# Patient Record
Sex: FEMALE | Race: WHITE
[De-identification: names, ages, dates, MRNs, and addresses within clinical notes are randomized per-mention and may not be internally consistent; named-entity substitution may affect disease eponyms.]

---

## 2020-12-30 ENCOUNTER — HOSPITAL ENCOUNTER (OUTPATIENT)
Dept: HOSPITAL 75 - ER | Age: 80
Setting detail: OBSERVATION
LOS: 1 days | Discharge: HOME | End: 2020-12-31
Attending: FAMILY MEDICINE | Admitting: FAMILY MEDICINE
Payer: MEDICARE

## 2020-12-30 VITALS — DIASTOLIC BLOOD PRESSURE: 88 MMHG | SYSTOLIC BLOOD PRESSURE: 160 MMHG

## 2020-12-30 VITALS — BODY MASS INDEX: 23.24 KG/M2 | WEIGHT: 131.18 LBS | HEIGHT: 62.99 IN

## 2020-12-30 DIAGNOSIS — R07.89: Primary | ICD-10-CM

## 2020-12-30 DIAGNOSIS — R06.02: ICD-10-CM

## 2020-12-30 DIAGNOSIS — Z88.5: ICD-10-CM

## 2020-12-30 DIAGNOSIS — Z85.3: ICD-10-CM

## 2020-12-30 DIAGNOSIS — M81.0: ICD-10-CM

## 2020-12-30 DIAGNOSIS — I12.9: ICD-10-CM

## 2020-12-30 DIAGNOSIS — Z80.8: ICD-10-CM

## 2020-12-30 DIAGNOSIS — Z79.899: ICD-10-CM

## 2020-12-30 DIAGNOSIS — K27.9: ICD-10-CM

## 2020-12-30 DIAGNOSIS — N18.9: ICD-10-CM

## 2020-12-30 DIAGNOSIS — Z80.3: ICD-10-CM

## 2020-12-30 LAB
ALBUMIN SERPL-MCNC: 4.5 GM/DL (ref 3.2–4.5)
ALP SERPL-CCNC: 66 U/L (ref 40–136)
ALT SERPL-CCNC: 18 U/L (ref 0–55)
APTT BLD: 20 SEC (ref 24–35)
APTT PPP: YELLOW S
BACTERIA #/AREA URNS HPF: NEGATIVE /HPF
BASOPHILS # BLD AUTO: 0 10^3/UL (ref 0–0.1)
BASOPHILS NFR BLD AUTO: 0 % (ref 0–10)
BILIRUB SERPL-MCNC: 0.3 MG/DL (ref 0.1–1)
BILIRUB UR QL STRIP: NEGATIVE
BUN/CREAT SERPL: 15
CALCIUM SERPL-MCNC: 9.5 MG/DL (ref 8.5–10.1)
CHLORIDE SERPL-SCNC: 101 MMOL/L (ref 98–107)
CK SERPL-CCNC: 90 U/L (ref 29–168)
CO2 SERPL-SCNC: 22 MMOL/L (ref 21–32)
CREAT SERPL-MCNC: 1.32 MG/DL (ref 0.6–1.3)
EOSINOPHIL # BLD AUTO: 0.1 10^3/UL (ref 0–0.3)
EOSINOPHIL NFR BLD AUTO: 2 % (ref 0–10)
FIBRINOGEN PPP-MCNC: CLEAR MG/DL
GFR SERPLBLD BASED ON 1.73 SQ M-ARVRAT: 39 ML/MIN
GLUCOSE SERPL-MCNC: 96 MG/DL (ref 70–105)
GLUCOSE UR STRIP-MCNC: NEGATIVE MG/DL
HCT VFR BLD CALC: 32 % (ref 35–52)
HGB BLD-MCNC: 10.5 G/DL (ref 11.5–16)
INR PPP: 0.9 (ref 0.8–1.4)
KETONES UR QL STRIP: NEGATIVE
LEUKOCYTE ESTERASE UR QL STRIP: NEGATIVE
LYMPHOCYTES # BLD AUTO: 1.9 10^3/UL (ref 1–4)
LYMPHOCYTES NFR BLD AUTO: 31 % (ref 12–44)
MAGNESIUM SERPL-MCNC: 2 MG/DL (ref 1.6–2.4)
MANUAL DIFFERENTIAL PERFORMED BLD QL: NO
MCH RBC QN AUTO: 31 PG (ref 25–34)
MCHC RBC AUTO-ENTMCNC: 33 G/DL (ref 32–36)
MCV RBC AUTO: 93 FL (ref 80–99)
MONOCYTES # BLD AUTO: 0.4 10^3/UL (ref 0–1)
MONOCYTES NFR BLD AUTO: 6 % (ref 0–12)
NEUTROPHILS # BLD AUTO: 3.6 10^3/UL (ref 1.8–7.8)
NEUTROPHILS NFR BLD AUTO: 60 % (ref 42–75)
NITRITE UR QL STRIP: NEGATIVE
PH UR STRIP: 6.5 [PH] (ref 5–9)
PLATELET # BLD: 247 10^3/UL (ref 130–400)
PMV BLD AUTO: 9.3 FL (ref 9–12.2)
POTASSIUM SERPL-SCNC: 4.1 MMOL/L (ref 3.6–5)
PROT SERPL-MCNC: 7.5 GM/DL (ref 6.4–8.2)
PROT UR QL STRIP: NEGATIVE
PROTHROMBIN TIME: 12 SEC (ref 12.2–14.7)
RBC #/AREA URNS HPF: (no result) /HPF
SODIUM SERPL-SCNC: 133 MMOL/L (ref 135–145)
SP GR UR STRIP: <=1.005 (ref 1.02–1.02)
SQUAMOUS #/AREA URNS HPF: (no result) /HPF
WBC # BLD AUTO: 6 10^3/UL (ref 4.3–11)
WBC #/AREA URNS HPF: (no result) /HPF

## 2020-12-30 PROCEDURE — 83880 ASSAY OF NATRIURETIC PEPTIDE: CPT

## 2020-12-30 PROCEDURE — 71045 X-RAY EXAM CHEST 1 VIEW: CPT

## 2020-12-30 PROCEDURE — 83874 ASSAY OF MYOGLOBIN: CPT

## 2020-12-30 PROCEDURE — 81000 URINALYSIS NONAUTO W/SCOPE: CPT

## 2020-12-30 PROCEDURE — 96374 THER/PROPH/DIAG INJ IV PUSH: CPT

## 2020-12-30 PROCEDURE — 85025 COMPLETE CBC W/AUTO DIFF WBC: CPT

## 2020-12-30 PROCEDURE — 85610 PROTHROMBIN TIME: CPT

## 2020-12-30 PROCEDURE — 96372 THER/PROPH/DIAG INJ SC/IM: CPT

## 2020-12-30 PROCEDURE — 82550 ASSAY OF CK (CPK): CPT

## 2020-12-30 PROCEDURE — 93005 ELECTROCARDIOGRAM TRACING: CPT

## 2020-12-30 PROCEDURE — 99284 EMERGENCY DEPT VISIT MOD MDM: CPT

## 2020-12-30 PROCEDURE — 36415 COLL VENOUS BLD VENIPUNCTURE: CPT

## 2020-12-30 PROCEDURE — 96375 TX/PRO/DX INJ NEW DRUG ADDON: CPT

## 2020-12-30 PROCEDURE — 78452 HT MUSCLE IMAGE SPECT MULT: CPT

## 2020-12-30 PROCEDURE — 80053 COMPREHEN METABOLIC PANEL: CPT

## 2020-12-30 PROCEDURE — 85379 FIBRIN DEGRADATION QUANT: CPT

## 2020-12-30 PROCEDURE — 93017 CV STRESS TEST TRACING ONLY: CPT

## 2020-12-30 PROCEDURE — 93306 TTE W/DOPPLER COMPLETE: CPT

## 2020-12-30 PROCEDURE — 96361 HYDRATE IV INFUSION ADD-ON: CPT

## 2020-12-30 PROCEDURE — 84484 ASSAY OF TROPONIN QUANT: CPT

## 2020-12-30 PROCEDURE — 85730 THROMBOPLASTIN TIME PARTIAL: CPT

## 2020-12-30 PROCEDURE — 83735 ASSAY OF MAGNESIUM: CPT

## 2020-12-30 RX ADMIN — ASPIRIN SCH MG: 81 TABLET ORAL at 22:12

## 2020-12-30 NOTE — ED CARDIAC GENERAL
History of Present Illness


General


Chief Complaint:  Chest Pain


Stated Complaint:  SOB/CHEST PAIN/HEADACHE


Source:  patient


Exam Limitations:  no limitations





History of Present Illness


Date Seen by Provider:  Dec 30, 2020


Time Seen by Provider:  16:50


Initial Comments


72-year-old female presents to the ER with complaints of shortness of breath 

while she was shopping at the grocery store 2 hours prior to arrival. States it 

feels heavy in her chest, and difficult to breathe. Denies "chest pain", 

however, she does have some nausea without emesis. States she was diagnosed with

COVID on , and had an uncomplicated course. Denies fevers, chills, 

cough, abdominal pain.


NTG SL PTA:  No


ASA po PTA:  No





Allergies and Home Medications


Allergies


Coded Allergies:  


     hydrocodone (Verified  Adverse Reaction, Unknown, Nausea, 20)


     oxycodone (Verified  Adverse Reaction, Unknown, Nausea, 20)





Patient Home Medication List


Home Medication List Reviewed:  Yes





Review of Systems


Review of Systems


Constitutional:  see HPI


EENTM:  No Symptoms Reported


Respiratory:  See HPI


Cardiovascular:  See HPI


Gastrointestinal:  See HPI


Genitourinary:  No Symptoms Reported


Musculoskeletal:  no symptoms reported


Skin:  no symptoms reported


Psychiatric/Neurological:  No Symptoms Reported


Endocrine:  No Symptoms Reported


Hematologic/Lymphatic:  No Symptoms Reported





Past Medical-Social-Family Hx


Patient Social History


Recent Foreign Travel:  No


Contact w/Someone Who Travel:  No





Physical Exam


Vital Signs





Vital Signs - First Documented








 20





 17:01


 


Temp 36.0


 


Pulse 74


 


Resp 18


 


B/P (MAP) 192/74 (113)


 


Pulse Ox 100


 


O2 Delivery Room Air





Capillary Refill : Less Than 3 Seconds


Height, Weight, BMI


Height: '"


Weight: lbs. oz. kg;  BMI


Method:


General Appearance:  No Apparent Distress, WD/WN, Anxious


HEENT:  PERRL/EOMI, TMs Normal, Normal ENT Inspection, Pharynx Normal


Neck:  Full Range of Motion, Normal Inspection, Supple


Respiratory:  Chest Non Tender, Lungs Clear, Normal Breath Sounds, No Accessory 

Muscle Use, No Respiratory Distress


Cardiovascular:  Regular Rate, Rhythm, No Gallop, No Murmur, Normal Peripheral 

Pulses


Gastrointestinal:  Normal Bowel Sounds, Non Tender, Soft


Extremity:  Normal Capillary Refill, Normal Inspection, Normal Range of Motion, 

Non Tender, No Calf Tenderness


Neurologic/Psychiatric:  Alert, Oriented x3, No Motor/Sensory Deficits, Normal 

Mood/Affect, CNs II-XII Norm as Tested


Skin:  Normal Color, Warm/Dry





Progress/Results/Core Measures


Results/Orders


Lab Results





Laboratory Tests








Test


 20


17:30 20


17:47 Range/Units


 


 


White Blood Count


 6.0 


 


 4.3-11.0


10^3/uL


 


Red Blood Count


 3.43 L


 


 3.80-5.11


10^6/uL


 


Hemoglobin 10.5 L  11.5-16.0  g/dL


 


Hematocrit 32 L  35-52  %


 


Mean Corpuscular Volume 93   80-99  fL


 


Mean Corpuscular Hemoglobin 31   25-34  pg


 


Mean Corpuscular Hemoglobin


Concent 33 


 


 32-36  g/dL





 


Red Cell Distribution Width 13.1   10.0-14.5  %


 


Platelet Count


 247 


 


 130-400


10^3/uL


 


Mean Platelet Volume 9.3   9.0-12.2  fL


 


Immature Granulocyte % (Auto) 0    %


 


Neutrophils (%) (Auto) 60   42-75  %


 


Lymphocytes (%) (Auto) 31   12-44  %


 


Monocytes (%) (Auto) 6   0-12  %


 


Eosinophils (%) (Auto) 2   0-10  %


 


Basophils (%) (Auto) 0   0-10  %


 


Neutrophils # (Auto)


 3.6 


 


 1.8-7.8


10^3/uL


 


Lymphocytes # (Auto)


 1.9 


 


 1.0-4.0


10^3/uL


 


Monocytes # (Auto)


 0.4 


 


 0.0-1.0


10^3/uL


 


Eosinophils # (Auto)


 0.1 


 


 0.0-0.3


10^3/uL


 


Basophils # (Auto)


 0.0 


 


 0.0-0.1


10^3/uL


 


Immature Granulocyte # (Auto)


 0.0 


 


 0.0-0.1


10^3/uL


 


Prothrombin Time 12.0 L  12.2-14.7  SEC


 


INR Comment 0.9   0.8-1.4  


 


Activated Partial


Thromboplast Time 20 L


 


 24-35  SEC





 


D-Dimer


 < 0.27 


 


 0.00-0.49


UG/ML


 


Sodium Level 133 L  135-145  MMOL/L


 


Potassium Level 4.1   3.6-5.0  MMOL/L


 


Chloride Level 101     MMOL/L


 


Carbon Dioxide Level 22   21-32  MMOL/L


 


Anion Gap 10   5-14  MMOL/L


 


Blood Urea Nitrogen 20 H  7-18  MG/DL


 


Creatinine


 1.32 H


 


 0.60-1.30


MG/DL


 


Estimat Glomerular Filtration


Rate 39 


 


  





 


BUN/Creatinine Ratio 15    


 


Glucose Level 96     MG/DL


 


Calcium Level 9.5   8.5-10.1  MG/DL


 


Corrected Calcium 9.1   8.5-10.1  MG/DL


 


Magnesium Level 2.0   1.6-2.4  MG/DL


 


Total Bilirubin 0.3   0.1-1.0  MG/DL


 


Aspartate Amino Transf


(AST/SGOT) 22 


 


 5-34  U/L





 


Alanine Aminotransferase


(ALT/SGPT) 18 


 


 0-55  U/L





 


Alkaline Phosphatase 66     U/L


 


Total Creatine Kinase 90     U/L


 


Myoglobin


 48.1 


 


 10.0-92.0


NG/ML


 


Troponin I < 0.028   <0.028  NG/ML


 


B-Type Natriuretic Peptide 75.1   <100.0  PG/ML


 


Total Protein 7.5   6.4-8.2  GM/DL


 


Albumin 4.5   3.2-4.5  GM/DL


 


Urine Color  YELLOW   


 


Urine Clarity  CLEAR   


 


Urine pH  6.5  5-9  


 


Urine Specific Gravity  <=1.005  1.016-1.022  


 


Urine Protein  NEGATIVE  NEGATIVE  


 


Urine Glucose (UA)  NEGATIVE  NEGATIVE  


 


Urine Ketones  NEGATIVE  NEGATIVE  


 


Urine Nitrite  NEGATIVE  NEGATIVE  


 


Urine Bilirubin  NEGATIVE  NEGATIVE  


 


Urine Urobilinogen  0.2  < = 1.0  MG/DL


 


Urine Leukocyte Esterase  NEGATIVE  NEGATIVE  


 


Urine RBC (Auto)  NEGATIVE  NEGATIVE  


 


Urine RBC  NONE   /HPF


 


Urine WBC  NONE   /HPF


 


Urine Squamous Epithelial


Cells 


 RARE 


  /HPF





 


Urine Crystals  NONE   /LPF


 


Urine Bacteria  NEGATIVE   /HPF


 


Urine Casts  NONE   /LPF


 


Urine Mucus  NEGATIVE   /LPF


 


Urine Culture Indicated  NO   








My Orders





Orders - THEE TUTTLE APRN


Cbc With Automated Diff (20 17:04)


Magnesium (20 17:04)


Chest 1 View, Ap/Pa Only (20 17:04)


Ekg Tracing (20 17:04)


Comprehensive Metabolic Panel (20 17:04)


Myoglobin Serum (20 17:04)


Protime With Inr (20 17:04)


Partial Thromboplastin Time (20 17:04)


Ed Iv/Invasive Line Start (20 17:04)


Creatine Kinase (20 17:04)


BNP (20 17:04)


Fibrin Degradation Products (20 17:04)


Troponin I (20 17:04)


Ondansetron Injection (Zofran Injectio (20 17:45)


Morphine  Injection (Morphine  Injection (20 17:31)


Ua Culture If Indicated (20 17:53)


Ns Iv 500 Ml (Sodium Chloride 0.9%) (20 18:15)


Troponin I (20 20:00)


Metoprolol Succinate (Xl) Tab (Toprol Xl (20 19:45)


Enoxaparin Injection (Lovenox Injection) (20 20:15)





Medications Given in ED





Current Medications








 Medications  Dose


 Ordered  Sig/Manpreet


 Route  Start Time


 Stop Time Status Last Admin


Dose Admin


 


 Enoxaparin Sodium  40 mg  ONCE  ONCE


 SC  20 20:15


 20 20:16 DC 20 20:16


40 MG


 


 Ondansetron HCl  4 mg  ONCE  ONCE


 IVP  20 17:45


 20 17:46 DC 20 18:00


4 MG


 


 Sodium Chloride  500 ml @ 


 30 mls/hr  [id number] ONCE


 IV  20 18:15


 20 10:54  20 18:33


30 MLS/HR








Vital Signs/I&O











 20





 17:01 17:01


 


Temp  36.0


 


Pulse  74


 


Resp  18


 


B/P (MAP)  192/74 (113)


 


Pulse Ox  100


 


O2 Delivery Room Air 














 20





 00:00


 


Intake Total 500 ml


 


Balance 500 ml











Progress


Progress Note :  


Progress Note


Initiated cardiac protocol upon arrival. EKG is unremarkable. No acute ST 

changes appreciated. Labs reviewed and are unremarkable. However, she is having 

persistent shortness of breath. Discussed case with Dr. Nash, who recommended 

observation admission for further workup.





Discussed plan of care with patient and she is agreeable with plan.





Full Code


Initial ECG Impression Date:  Dec 30, 2020


Initial ECG Impression Time:  16:53


Initial ECG Rate:  75


Initial ECG Rhythm:  Normal Sinus


Initial ECG Intervals:  KS


Initial ECG Impression:  Nonspecific Changes


Initial ECG Comparisson:  No Previous ECG Available





Diagnostic Imaging





   Diagonstic Imaging:  Xray


   Plain Films/CT/US/NM/MRI:  chest


Comments


NAME:   TAMMI HENDERSON


MED REC#:   [id number]


ACCOUNT#:   [id number]


PT STATUS:   REG ER


:   1940


PHYSICIAN:   THEE TUTTLE


ADMIT DATE:   20/ER


***Signed***


Date of Exam:20





CHEST 1 VIEW, AP/PA ONLY








EXAMINATION: Chest 1 view





HISTORY: Chest pain





COMPARISON: None available.





FINDINGS: 





Heart size and pulmonary vasculature are normal. The lungs are


clear without consolidation, pleural effusion, or pneumothorax.


The osseous structures are intact.





IMPRESSION: 





1. No acute radiographic abnormality in the chest.





Dictated by: 





  Dictated on workstation # [id number]








Dict:   20


Trans:   20


[facility] [id number]





Interpreted by:     KIA PAREDES DO


Electronically signed by: KIA PAREDES DO 20





Departure


Communication (Admissions)


Time/Spoke to Admitting Phy:  18:58


Discussed case with Dr. Law agreeable with observation admission.


Time/Spoke to Consulting Phy:  18:52


Discussed case with Dr. Nash, who recommended observation admission for 

cardiac workup. Orders given for Toprol 50 mg XL now, Lovenox 40 mg subcutaneous

now and daily, and 81mg aspirin daily. Admit to hospitalist.





Impression





   Primary Impression:  


   Chest pain on exertion


Disposition:   ADMITTED AS INPATIENT


Condition:  Stable





Admissions


Decision to Admit Reason:  Admit from ER (General)


Decision to Admit/Date:  Dec 30, 2020


Time/Decision to Admit Time:  18:50











THEE TUTTLE           Dec 30, 2020 17:09

## 2020-12-30 NOTE — NUR
PT'S DAUGHTER MARQUIS CALLED, PASSWORD PROVIDED, PHONE CALL WAS MADE TO CHECK ON THIS PT. THIS 
RN WAS INFORMED BY MARQUIS THAT SHE WOULD LIKE DR. JOHNSTON TO CALL THIS PT SON IN LAW ROHITH WHO IS 
A NURSE PRACTITIONER -[phone]; ALSO IF NOTHING IS FOUND ON THE ECHO THEY WOULD LIKE 
THE PT TO HAVE A VQ SCAN. THEY ARE CONCERNED ABOUT THIS NEW INCREASED SOA WITH EXERTION. 
THIS RN SPOKE WITH THE PT, THIS PT AGREES THAT IT IS FINE FOR ANY PROVIDERS TO DISCUSS HER 
MEDICAL CARE WITH ROHITH.

## 2020-12-30 NOTE — NUR
TAMMI HENDERSON admitted to room 416-1, with an admitting diagnosis of CHEST PAIN, on 
12/30/20 from ED via , accompanied by STAFF.TAMMI HENDERSON introduced to surroundings, 
call light, bed controls, phone, TV, temperature control, lights, meal times, smoking 
policy, visitor policy, side rail policy, bathrooms and showers.  Patient Rights given to 
patient in the handbook. TAMMI HENDERSON verbalizes understanding that [facility] is not 
responsible for the loss or damage to any personal effects or valuables that are kept in the 
patients possession during their hospitalization. TAMMI HENDERSON verbalizes understanding of 
Interdisciplinary Patient Education. Patient and/or family were informed about the Rapid 
Response Team and its purpose.

## 2020-12-30 NOTE — DIAGNOSTIC IMAGING REPORT
EXAMINATION: Chest 1 view



HISTORY: Chest pain



COMPARISON: None available.



FINDINGS: 



Heart size and pulmonary vasculature are normal. The lungs are

clear without consolidation, pleural effusion, or pneumothorax.

The osseous structures are intact.



IMPRESSION: 



1. No acute radiographic abnormality in the chest.



Dictated by: 



  Dictated on workstation # [id number]

## 2020-12-31 VITALS — SYSTOLIC BLOOD PRESSURE: 182 MMHG | DIASTOLIC BLOOD PRESSURE: 76 MMHG

## 2020-12-31 VITALS — SYSTOLIC BLOOD PRESSURE: 118 MMHG | DIASTOLIC BLOOD PRESSURE: 64 MMHG

## 2020-12-31 VITALS — DIASTOLIC BLOOD PRESSURE: 72 MMHG | SYSTOLIC BLOOD PRESSURE: 149 MMHG

## 2020-12-31 VITALS — SYSTOLIC BLOOD PRESSURE: 133 MMHG | DIASTOLIC BLOOD PRESSURE: 61 MMHG

## 2020-12-31 VITALS — SYSTOLIC BLOOD PRESSURE: 179 MMHG | DIASTOLIC BLOOD PRESSURE: 72 MMHG

## 2020-12-31 VITALS — DIASTOLIC BLOOD PRESSURE: 78 MMHG | SYSTOLIC BLOOD PRESSURE: 146 MMHG

## 2020-12-31 VITALS — DIASTOLIC BLOOD PRESSURE: 67 MMHG | SYSTOLIC BLOOD PRESSURE: 137 MMHG

## 2020-12-31 VITALS — SYSTOLIC BLOOD PRESSURE: 145 MMHG | DIASTOLIC BLOOD PRESSURE: 64 MMHG

## 2020-12-31 LAB
ALBUMIN SERPL-MCNC: 3.9 GM/DL (ref 3.2–4.5)
ALP SERPL-CCNC: 53 U/L (ref 40–136)
ALT SERPL-CCNC: 17 U/L (ref 0–55)
BASOPHILS # BLD AUTO: 0 10^3/UL (ref 0–0.1)
BASOPHILS NFR BLD AUTO: 0 % (ref 0–10)
BILIRUB SERPL-MCNC: 0.3 MG/DL (ref 0.1–1)
BUN/CREAT SERPL: 19
CALCIUM SERPL-MCNC: 8.7 MG/DL (ref 8.5–10.1)
CHLORIDE SERPL-SCNC: 105 MMOL/L (ref 98–107)
CO2 SERPL-SCNC: 20 MMOL/L (ref 21–32)
CREAT SERPL-MCNC: 1.08 MG/DL (ref 0.6–1.3)
EOSINOPHIL # BLD AUTO: 0.2 10^3/UL (ref 0–0.3)
EOSINOPHIL NFR BLD AUTO: 2 % (ref 0–10)
GFR SERPLBLD BASED ON 1.73 SQ M-ARVRAT: 49 ML/MIN
GLUCOSE SERPL-MCNC: 82 MG/DL (ref 70–105)
HCT VFR BLD CALC: 31 % (ref 35–52)
HGB BLD-MCNC: 10 G/DL (ref 11.5–16)
LYMPHOCYTES # BLD AUTO: 2.2 10^3/UL (ref 1–4)
LYMPHOCYTES NFR BLD AUTO: 36 % (ref 12–44)
MANUAL DIFFERENTIAL PERFORMED BLD QL: NO
MCH RBC QN AUTO: 31 PG (ref 25–34)
MCHC RBC AUTO-ENTMCNC: 33 G/DL (ref 32–36)
MCV RBC AUTO: 95 FL (ref 80–99)
MONOCYTES # BLD AUTO: 0.5 10^3/UL (ref 0–1)
MONOCYTES NFR BLD AUTO: 8 % (ref 0–12)
NEUTROPHILS # BLD AUTO: 3.2 10^3/UL (ref 1.8–7.8)
NEUTROPHILS NFR BLD AUTO: 53 % (ref 42–75)
PLATELET # BLD: 261 10^3/UL (ref 130–400)
PMV BLD AUTO: 9.8 FL (ref 9–12.2)
POTASSIUM SERPL-SCNC: 4.2 MMOL/L (ref 3.6–5)
PROT SERPL-MCNC: 6.6 GM/DL (ref 6.4–8.2)
SODIUM SERPL-SCNC: 136 MMOL/L (ref 135–145)
WBC # BLD AUTO: 6.2 10^3/UL (ref 4.3–11)

## 2020-12-31 RX ADMIN — Medication SCH ML: at 14:14

## 2020-12-31 RX ADMIN — Medication SCH ML: at 06:16

## 2020-12-31 RX ADMIN — Medication SCH ML: at 00:13

## 2020-12-31 RX ADMIN — ASPIRIN SCH MG: 81 TABLET ORAL at 08:50

## 2020-12-31 NOTE — NUR
PATIENT REFUSED ASPIRIN.  SHE STATED SHE HAD A BLEEDING ULCER ABOUT 1 YEAR AGO AND WAS IN 
ICU.  SHE WAS TOLD NOT TO TAKE ANY ASPIRIN PRODUCTS.

## 2020-12-31 NOTE — NUR
SPOKE WITH THE PT AND CALLED [facility] TO COMPLETE THE MED REC



ENALAPRIL 20MG- DIRECTIONS SHOW 1 TAB BID HOWEVER PT SAYS SHE TAKES 1 TAB AT NOON

HCTZ 12.5MG- DIRECTIONS SHOW  TAB DAILY HOWEVER PT IS TAKING 1 TAB EVERY OTHER DAY DUE TO 
IT BEING TO HARD TO SPILT THE TABS



FILL DATE FROM [facility]:

09- ENALAPRIL 20MG #180/90DS

09- METOPROLOL SUCC 25MG #90/90DS

10- AMLODIPINE 5MG #90/90DS

11- ZOLPIDEM 5MG #28

12- DICLOFENAC GEL #1

12- HCTZ 12.5MG #45/90DS

12- LATANOPROST #1



PT GETS PROLIA EVERY 6 MONTHS FROM THE [facility] BUT SHE CANNOT REMEMBER THE DATE OF HER 
LAST DOSE. ACCORDING TO THE PT SHE HAS IT WRITTEN DOWN AT HOME AND KNOWS SHE IS NOT DUE FOR 
HER NEXT SHOT FOR SEVERAL MONTHS



OTC MEDS:

MTV

CALCIUM W/ VIT D

TYLENOL

NIACINAMIDE

SOOTH EYE DROPS

## 2020-12-31 NOTE — CONSULTATION-CARDIOLOGY
HPI-Cardiology


Cardiology Consultation:


Date of Consultation


20


Time Seen by a Provider:  09:30


Date of Admission


2020


Attending Physician


Alma Rosa Law MD


Admitting Physician


No,Local Physician


Consulting Physician


Sarah Nash MD





HPI:


Chief Complaint:


SOB


Ms. Cordero is a 80 yr old female admitted to 416 from the ED with c/o increasing

SOB while out shopping yesterday.  She reports she has had SOB following COVID 

infection in 2020.  However, typically she can sit and rest and the SOB

resolves.  Yesterday she sat for approx 2 hrs and her SOB persisted so she came 

to the ED.  She is currently not reporting any SOB.  No c/o CP, palpitations, 

syncope, near syncope or LE swelling.  No c/o n/v/d.  No c/o fever or chills.





Review of Systems-Cardiology


Review of Systems


Constitutional:  No chills, No fever


Ears/Nose/Throat:  No epistaxis, No recent hearing loss


Respiratory:  As described under HPI


Cardiovascular:  As described under HPI


Gastrointestinal:  No constipation, No diarrhea, No nausea, No vomiting


Genitourinary:  No dysuria, No hematuria


Musculoskeletal:  no symptoms reported


Skin:  No rash on exposed areas, No ulcerations on exposed areas


Psychiatric/Neurological:  No anxiety, No depression, No seizure, No focal 

weakness, No syncope


Hematologic:  No bleeding abnormalities





PMH-Social-Family Hx


Patient Social History


Alcohol Use:  Denies Use


Recreational Drug Use:  No


Smoking Status:  Never a Smoker


Recent Foreign Travel:  No


Recent Infectious Disease Expo:  No





Past Medical History


PMH


As described under Assessment.





Family Medical History


Family Medical History:  


She reports her mother had HTN.  She repots her brother had CAD.


Family History:  


19 FATHER


  Bradycardia


19 MOTHER


  Hypertension


  FHx: lung disease


G8 BROTHER


  Cardiovascular disease


  FHx: skin cancer


G8 SISTER, 


  Ovarian,uterine, liver, & lung cancer


G8 SISTER


  FHx: breast cancer


G8 SISTER


  Glioblastoma


Relation not specified for:


  Lung disease


  Sinus bradycardia


  Sinus bradycardia





Allergies and Home Medications


Allergies


Coded Allergies:  


     hydrocodone (Verified  Adverse Reaction, Unknown, Nausea, 20)


     oxycodone (Verified  Adverse Reaction, Unknown, Nausea, 20)





Physical Exam-Cardiology


Physical Exam


Vital Signs/I&O











 20





 00:13 00:34 04:07 04:33


 


Temp  36.6  36.8


 


Pulse 65 66 66 66


 


Resp  20  18


 


B/P (MAP)  118/64 (82)  137/67 (90)


 


Pulse Ox  96 96 97


 


O2 Delivery  Room Air  Room Air


 


    





 20  





 07:00 07:25  


 


Temp  36.0  


 


Pulse 63 64  


 


Resp  20  


 


B/P (MAP)  133/61 (85)  


 


Pulse Ox  97  


 


O2 Delivery  Room Air  














 20





 00:00


 


Intake Total 740 ml


 


Output Total 0 ml


 


Balance 740 ml





Capillary Refill : Less Than 3 Seconds


Constitutional:  AAO x 3, well-developed, well-nourished


HEENT:  PERRL, hearing is well preserved, oral hygience is good


Neck:  No carotid bruit; carotid pulses are 2 + bilaterally


Respiratory:  No accessory muscle use, No respiratory distress; chest expansion 

is symmetric, chest is bilaterally symmetric, lungs clear to auscultation


Cardiovascular:  regular rate-rhythm; No JVD; S1 and S2


Gastrointestinal:  No tender; soft, round, audible bowel sounds


Extremities:  no lower extremity edema bilateral


Neurologic/Psychiatric:  grossly intact (moves all extremities)


Skin:  No rash on exposed areas, No ulcerations on exposed areas





Data Review


Labs


Laboratory Tests


20 17:30: 


White Blood Count 6.0, Red Blood Count 3.43L, Hemoglobin 10.5L, Hematocrit 32L, 

Mean Corpuscular Volume 93, Mean Corpuscular Hemoglobin 31, Mean Corpuscular 

Hemoglobin Concent 33, Red Cell Distribution Width 13.1, Platelet Count 247, 

Mean Platelet Volume 9.3, Immature Granulocyte % (Auto) 0, Neutrophils (%) 

(Auto) 60, Lymphocytes (%) (Auto) 31, Monocytes (%) (Auto) 6, Eosinophils (%) 

(Auto) 2, Basophils (%) (Auto) 0, Neutrophils # (Auto) 3.6, Lymphocytes # (Auto)

1.9, Monocytes # (Auto) 0.4, Eosinophils # (Auto) 0.1, Basophils # (Auto) 0.0, 

Immature Granulocyte # (Auto) 0.0, Prothrombin Time 12.0L, INR Comment 0.9, 

Activated Partial Thromboplast Time 20L, D-Dimer < 0.27, Sodium Level 133L, 

Potassium Level 4.1, Chloride Level 101, Carbon Dioxide Level 22, Anion Gap 10, 

Blood Urea Nitrogen 20H, Creatinine 1.32H, Estimat Glomerular Filtration Rate 

39, BUN/Creatinine Ratio 15, Glucose Level 96, Calcium Level 9.5, Corrected 

Calcium 9.1, Magnesium Level 2.0, Total Bilirubin 0.3, Aspartate Amino Transf 

(AST/SGOT) 22, Alanine Aminotransferase (ALT/SGPT) 18, Alkaline Phosphatase 66, 

Total Creatine Kinase 90, Myoglobin 48.1, Troponin I < 0.028, B-Type Natriuretic

Peptide 75.1, Total Protein 7.5, Albumin 4.5


20 17:47: 


Urine Color YELLOW, Urine Clarity CLEAR, Urine pH 6.5, Urine Specific Gravity 

<=1.005, Urine Protein NEGATIVE, Urine Glucose (UA) NEGATIVE, Urine Ketones 

NEGATIVE, Urine Nitrite NEGATIVE, Urine Bilirubin NEGATIVE, Urine Urobilinogen 

0.2, Urine Leukocyte Esterase NEGATIVE, Urine RBC (Auto) NEGATIVE, Urine RBC 

NONE, Urine WBC NONE, Urine Squamous Epithelial Cells RARE, Urine Crystals NONE,

Urine Bacteria NEGATIVE, Urine Casts NONE, Urine Mucus NEGATIVE, Urine Culture 

Indicated NO


20 21:25: Troponin I < 0.028


20 05:20: 


White Blood Count 6.2, Red Blood Count 3.20L, Hemoglobin 10.0L, Hematocrit 31L, 

Mean Corpuscular Volume 95, Mean Corpuscular Hemoglobin 31, Mean Corpuscular 

Hemoglobin Concent 33, Red Cell Distribution Width 13.1, Platelet Count 261, 

Mean Platelet Volume 9.8, Immature Granulocyte % (Auto) 0, Neutrophils (%) 

(Auto) 53, Lymphocytes (%) (Auto) 36, Monocytes (%) (Auto) 8, Eosinophils (%) 

(Auto) 2, Basophils (%) (Auto) 0, Neutrophils # (Auto) 3.2, Lymphocytes # (Auto)

2.2, Monocytes # (Auto) 0.5, Eosinophils # (Auto) 0.2, Basophils # (Auto) 0.0, 

Immature Granulocyte # (Auto) 0.0, Sodium Level 136, Potassium Level 4.2, 

Chloride Level 105, Carbon Dioxide Level 20L, Anion Gap 11, Blood Urea Nitrogen 

20H, Creatinine 1.08, Estimat Glomerular Filtration Rate 49, BUN/Creatinine 

Ratio 19, Glucose Level 82, Calcium Level 8.7, Corrected Calcium 8.8, Total B

ilirubin 0.3, Aspartate Amino Transf (AST/SGOT) 24, Alanine Aminotransferase 

(ALT/SGPT) 17, Alkaline Phosphatase 53, Troponin I < 0.028, Total Protein 6.6, 

Albumin 3.9





Laboratory Tests


20 17:30








20 05:20











Radiology


NAME:   TAMMI CORDERO


MED REC#:   [id number]


ACCOUNT#:   [id number]


PT STATUS:   REG ER


:   1940


PHYSICIAN:   THEE TUTTLE APRN


ADMIT DATE:   20/ER


***Signed***


Date of Exam:20





CHEST 1 VIEW, AP/PA ONLY








EXAMINATION: Chest 1 view





HISTORY: Chest pain





COMPARISON: None available.





FINDINGS: 





Heart size and pulmonary vasculature are normal. The lungs are


clear without consolidation, pleural effusion, or pneumothorax.


The osseous structures are intact.





IMPRESSION: 





1. No acute radiographic abnormality in the chest.





Dictated by: 





  Dictated on workstation # [id number]








Dict:   20


Trans:   20


[facility] [id number]





Interpreted by:     KIA PAREDES DO


Electronically signed by: KIA PAREDES DO 20





ECG Impression


ECG


Initial ECG Rhythm:  Normal Sinus





A/P-Cardiology


Assessment/Admission Diagnosis


Progressive dyspnea of undetermined etiology





H/O COVID (+) 2020





HTN





PUD - reports intolerance to ASA





H/O pancreatitis





Discussion and Recomendations


Progressive dyspnea of undetermined etiology


Advise echocardiogram to eval structure and function


Advise MPI to eval perfusion


Monitor lab closely


Replace electrolytes as indicated


We would like to thank medical services for this consult





Clinical Quality Measures


AMI/AHF:


ASA po Prior to arrival:  No





DVT/VTE Risk/Contraindication:


Risk Factor Score Per Nursin


RFS Level Per Nursing on Admit:  2=Moderate











GABRIELA CHAPIN           Dec 31, 2020 10:22

## 2020-12-31 NOTE — DISCHARGE INST-SIMPLE/STANDARD
Discharge Inst-Standard


Patient Instructions/Follow Up


Plan of Care/Instructions/FU:  


Please continue to take your medications as written. Please follow up with


your primary care doctor to follow up this hospital stay.


Activity as Tolerated:  Yes


Discharge Diet:  Cardiac Diet


Return to The Hospital For:  


Chest pain, heart racing, shortness of breath, abdominal pain, fever,


confusion, if you feel you are getting worse.





Planned Outpatient Orders/Ref.


Pneu Vac Indicated:  Yes











JODIE JOHNSTON MD             Dec 31, 2020 5:25 pm

## 2020-12-31 NOTE — CONSULTATION-CARDIOLOGY
HPI-Cardiology


Cardiology Consultation:


Date of Consultation


20


Time Seen by a Provider:  17:10


Date of Admission





Attending Physician


Alma Rosa Law MD


Admitting Physician


No,Local Physician


Consulting Physician


SHARON RIVERA MD, MA, FACP, FACC, [facility], CCDS





Physician requesting consult: Dr Law





HPI:


Chief Complaint:


CC: SOB





HPI


Ms. Cordero is a 80 yr old female admitted to 416 from the ED with c/o increasing

SOB while out shopping yesterday.  She reports she has had SOB following COVID 

infection in 2020.  However, typically she can sit and rest and the SOB

resolves.  Yesterday she sat for approx 2 hrs and her SOB persisted so she came 

to the ED.  She is currently not reporting any SOB.  No c/o CP, palpitations, 

syncope, near syncope or LE swelling.  No c/o n/v/d.  No c/o fever or chills.





Review of Systems-Cardiology


Review of Systems


Constitutional:  No chills, No fever


Ears/Nose/Throat:  No epistaxis, No recent hearing loss


Respiratory:  As described under HPI


Cardiovascular:  As described under HPI


Gastrointestinal:  No constipation, No diarrhea, No nausea, No vomiting


Genitourinary:  No dysuria, No hematuria


Musculoskeletal:  no symptoms reported


Skin:  No rash on exposed areas, No ulcerations on exposed areas


Psychiatric/Neurological:  No anxiety, No depression, No seizure, No focal 

weakness, No syncope


Hematologic:  No bleeding abnormalities





PMH-Social-Family Hx


Patient Social History


Alcohol Use:  Denies Use


Recreational Drug Use:  No


Smoking Status:  Never a Smoker


Recent Foreign Travel:  No


Recent Infectious Disease Expo:  No





Past Medical History


PMH


As described under Assessment.





Family Medical History


Family Medical History:  


She reports her mother had HTN.  She repots her brother had CAD.


Family History:  


Bradycardia


  19 FATHER


Cardiovascular disease


  G8 BROTHER


FHx: breast cancer


  G8 SISTER


FHx: lung disease


  19 MOTHER


FHx: skin cancer


  G8 BROTHER


Glioblastoma


  G8 SISTER


Hypertension


  19 MOTHER


Lung disease


Ovarian,uterine, liver, & lung cancer


  G8 SISTER, 


Sinus bradycardia


Sinus bradycardia





Allergies and Home Medications


Allergies


Coded Allergies:  


     hydrocodone (Verified  Adverse Reaction, Unknown, Nausea, 20)


     oxycodone (Verified  Adverse Reaction, Unknown, Nausea, 20)





Home Medications


Acetaminophen 325 Mg Tablet, 325-650 MG PO Q8H PRN for PAIN-MILD (1-4), 

(Reported)


Amlodipine Besylate 5 Mg Tablet, 5 MG PO HS, (Reported)


Calcium Carbonate/Vitamin D3 1 Each Tablet, 1 EACH PO DAILY, (Reported)


Clonidine HCl 0.1 Mg Tablet, 0.05 MG PO BID PRN for BP OVER 160, (Reported)


   TAKES  OF A 0.1MG TAB 


Denosumab 60 Mg/1 Ml Disp.syrin, 60 MG SQ EVERY 6 MONTHS, (Reported)


Diclofenac Sodium 100 Gm Gel..gram., 4 GM TP BID PRN for PAIN-BREAKTHROUGH, 

(Reported)


Enalapril Maleate 20 Mg Tablet, 20 MG PO 1200, (Reported)


Hydrochlorothiazide 12.5 Mg Tablet, 12.5 MG PO Q48H, (Reported)


Latanoprost 2.5 Ml Drops, 1 DROPS OS HS, (Reported)


Metoprolol Succinate 25 Mg Tab.er.24h, 25 MG PO DAILY, (Reported)


Multivitamin 1 Each Tablet, 1 EACH PO DAILY, (Reported)


Niacinamide 500 Mg Tablet, 500 MG PO DAILY, (Reported)


Povidone 15 Ml Drops, 1-2 DROPS OT PRN PRN for DRY EYES, (Reported)


Zolpidem Tartrate 5 Mg Tablet, 2.5 MG PO HS, (Reported)


   TAKES  OF A 5MG TAB 





Patient Home Medication List


Home Medication List Reviewed:  Yes





Physical Exam-Cardiology


Physical Exam


Vital Signs/I&O











 20





 07:00 07:25 08:00 11:15


 


Temp  36.0  37.0


 


Pulse 63 64  60


 


Resp  20  16


 


B/P (MAP)  133/61 (85)  145/64 (91)


 


Pulse Ox  97 97 97


 


O2 Delivery  Room Air Room Air Room Air


 


    





 20





 12:32 13:55 14:02 14:04


 


Pulse 60 63 65 63


 


Resp   18 16


 


B/P (MAP)  146/78 (100) 182/76 (111) 179/72 (107)


 


Pulse Ox  98 99 99


 


O2 Delivery  Room Air Room Air Room Air





 20   





 16:00   


 


Temp 36.8   


 


Pulse 65   


 


Resp 18   


 


B/P (MAP) 149/72 (97)   


 


Pulse Ox 98   


 


O2 Delivery Room Air   














 20





 00:00


 


Intake Total 740 ml


 


Output Total 0 ml


 


Balance 740 ml





Capillary Refill : Less Than 3 Seconds


Constitutional:  AAO x 3, well-developed, well-nourished


HEENT:  PERRL, hearing is well preserved, oral hygience is good


Neck:  No carotid bruit; carotid pulses are 2 + bilaterally


Respiratory:  No accessory muscle use, No respiratory distress; chest expansion 

is symmetric, chest is bilaterally symmetric, lungs clear to auscultation


Cardiovascular:  regular rate-rhythm; No JVD; S1 and S2


Gastrointestinal:  No tender; soft, round, audible bowel sounds


Extremities:  no lower extremity edema bilateral


Neurologic/Psychiatric:  grossly intact (moves all extremities)


Skin:  No rash on exposed areas, No ulcerations on exposed areas





Data Review


Labs


Laboratory Tests


20 17:47: 


Urine Color YELLOW, Urine Clarity CLEAR, Urine pH 6.5, Urine Specific Gravity 

<=1.005, Urine Protein NEGATIVE, Urine Glucose (UA) NEGATIVE, Urine Ketones 

NEGATIVE, Urine Nitrite NEGATIVE, Urine Bilirubin NEGATIVE, Urine Urobilinogen 

0.2, Urine Leukocyte Esterase NEGATIVE, Urine RBC (Auto) NEGATIVE, Urine RBC 

NONE, Urine WBC NONE, Urine Squamous Epithelial Cells RARE, Urine Crystals NONE,

Urine Bacteria NEGATIVE, Urine Casts NONE, Urine Mucus NEGATIVE, Urine Culture 

Indicated NO


20 21:25: Troponin I < 0.028


20 05:20: 


Troponin I < 0.028, White Blood Count 6.2, Red Blood Count 3.20L, Hemoglobin 

10.0L, Hematocrit 31L, Mean Corpuscular Volume 95, Mean Corpuscular Hemoglobin 

31, Mean Corpuscular Hemoglobin Concent 33, Red Cell Distribution Width 13.1, 

Platelet Count 261, Mean Platelet Volume 9.8, Immature Granulocyte % (Auto) 0, 

Neutrophils (%) (Auto) 53, Lymphocytes (%) (Auto) 36, Monocytes (%) (Auto) 8, 

Eosinophils (%) (Auto) 2, Basophils (%) (Auto) 0, Neutrophils # (Auto) 3.2, 

Lymphocytes # (Auto) 2.2, Monocytes # (Auto) 0.5, Eosinophils # (Auto) 0.2, 

Basophils # (Auto) 0.0, Immature Granulocyte # (Auto) 0.0, Sodium Level 136, 

Potassium Level 4.2, Chloride Level 105, Carbon Dioxide Level 20L, Anion Gap 11,

Blood Urea Nitrogen 20H, Creatinine 1.08, Estimat Glomerular Filtration Rate 49,

BUN/Creatinine Ratio 19, Glucose Level 82, Calcium Level 8.7, Corrected Calcium 

8.8, Total Bilirubin 0.3, Aspartate Amino Transf (AST/SGOT) 24, Alanine 

Aminotransferase (ALT/SGPT) 17, Alkaline Phosphatase 53, Total Protein 6.6, 

Albumin 3.9





Laboratory Tests


20 17:30








20 05:20











A/P-Cardiology


Assessment/Admission Diagnosis





Exertional shortness of breath, noncardiac (see w/u below) etiology 

undetermined, managed by Dr Law





Anemia of undetermined etiology, managed by Dr Law





MPI of 20: no ischemia or infarction, LVEF 78%





Echo of 20: LVEF 60-65%, PASP 25-30 %





H/O COVID (+) 2020





HTN





PUD - reports intolerance to ASA





H/O pancreatitis





Discussion and Recomendations





* Shortness of breath does not appear to be of cardiac origin. Ok to d/c from 

  cardiac standpoint


* Continue treatment for hypertension


* Outpt cardiac f/u advised


* Other issues being managed by Dr Law





Clinical Quality Measures


AMI/AHF:


ASA po Prior to arrival:  No





DVT/VTE Risk/Contraindication:


Risk Factor Score Per Nursin


RFS Level Per Nursing on Admit:  2=Moderate











SHARON RIVERA MD FACP FAC CCDS   Dec 31, 2020 17:47

## 2020-12-31 NOTE — STRESS TEST
DATE OF SERVICE:  12/31/2020



RESTING AND POST REGADENOSON TECHNETIUM-99M TETROFOSMIN SPECT CT IMAGING



ORDERING PHYSICIAN:

GENET Garcia



ATTENDING PHYSICIAN:

Dr. Law.



CLINICAL DIAGNOSIS:

Chest discomfort.



Baseline images were carried out after injection of 9.84 mCi of technetium-99m

Tetrofosmin.  This was followed by 0.4 mg regadenoson and 31 mCi of

technetium-99m Tetrofosmin for stress imaging.  The electrocardiogram showed

sinus rhythm at baseline.  It did not change significantly with regadenoson

infusion.  The patient tolerated the procedure well.  Review of images at rest

and following stress does not indicate any significant perfusion defects

consistent with myocardial ischemia or infarction.  Gated images show normal

global left ventricular systolic function with normal regional wall motion. 

Left ventricular ejection fraction is calculated to be 78%.  Left ventricular

end diastolic volume is 21 mL.  TID is absent (1.11).



CONCLUSIONS:

1.  No evidence of any significant myocardial ischemia or infarction on this

study.

2.  Normal to hyperdynamic left ventricular systolic function with a calculated

ejection fraction of 78%.





Job ID: [id number]

DocumentID: [id number]

Dictated Date:  12/31/2020 17:06:50

Transcription Date: 12/31/2020 19:58:30

Dictated By: SHARON RIVERA MD, MA, FACP, FACC,

## 2021-05-11 ENCOUNTER — HOSPITAL ENCOUNTER (OUTPATIENT)
Dept: HOSPITAL 75 - RAD | Age: 81
End: 2021-05-11
Attending: FAMILY MEDICINE
Payer: MEDICARE

## 2021-05-11 DIAGNOSIS — M47.816: Primary | ICD-10-CM

## 2021-05-11 DIAGNOSIS — M51.36: ICD-10-CM

## 2021-05-11 DIAGNOSIS — M43.16: ICD-10-CM

## 2021-05-11 PROCEDURE — 72148 MRI LUMBAR SPINE W/O DYE: CPT

## 2021-05-11 PROCEDURE — 72195 MRI PELVIS W/O DYE: CPT

## 2021-05-11 NOTE — DIAGNOSTIC IMAGING REPORT
PROCEDURE: MRI lumbar spine without contrast.



TECHNIQUE:  Multiplanar, multisequence MRI of the lumbar spine

was performed without contrast.



INDICATION: Low back pain. Left-sided lower extremity

radiculopathy.



COMPARISON: None.



FINDINGS: 5 lumbar type vertebral bodies are visualized with the

last well-formed disc space designated L5-S1. No acute fracture

or dislocation is seen in the lumbar spine. There is

straightening of the lumbar spine with grade 1 anterolisthesis of

L4 on L5. No focal osseous lesions are identified. Endplate

degenerative changes are present at the L1-L2 and L4-L5 levels,

both of which represent Modic type I changes.



The conus terminates at the L1 level. No masses are seen

associated with the conus or nerve roots of the cauda equina. No

epidural collections are identified.



Multilevel degenerative changes are seen in the lumbar spine with

disc bulges, facet hypertrophy, and buckling of the ligamentum

flavum.



T12-L1: No significant spinal canal or foraminal stenosis. 

L1-L2: Disc desiccation with right subarticular protrusion, facet

hypertrophy, and buckling of the ligamentum flavum results in

moderate-to-severe right lateral recess stenosis and moderate

right and mild left foraminal stenosis. 

L2-L3: Broad-based disc bulge, facet hypertrophy, and buckling of

the ligamentum flavum results in mild-to-moderate spinal canal

narrowing and mild-to-moderate right and moderate left foraminal

stenosis. 

L3-L4: Broad-based disc bulge, facet hypertrophy, and buckling of

the ligamentum flavum results in mild-to-moderate spinal canal

stenosis and moderate-to-severe right and moderate left foraminal

stenosis. 

L4-L5: Broad-based disc bulge, facet hypertrophy, and buckling of

the ligamentum flavum results in severe spinal canal stenosis and

moderate right and moderate-to-severe left foraminal stenosis. 

L5-S1: Broad-based disc bulge, facet hypertrophy, and buckling of

the ligamentum flavum results in mild spinal canal narrowing and

moderate bilateral foraminal stenosis.  



Paravertebral soft tissues are unremarkable. Benign appearing

cortical cysts are seen in the kidneys.



IMPRESSION:

1. No acute fracture or dislocation in the lumbar spine. 

2. Multilevel degenerative changes in the lumbar spine, greatest

at L4-L5.

3. Modic type I endplate degenerative changes at the L1-L2 and

L4-L5 levels.

4. Grade 1 anterolisthesis of L4 on L5.



Dictated by: 



  Dictated on workstation # [id number]

## 2021-05-11 NOTE — DIAGNOSTIC IMAGING REPORT
EXAMINATION: MRI pelvis without contrast from 05/11/2021.



TECHNIQUE: Multiplanar, multisequence MRI of the pelvis was

performed without contrast.



INDICATION: Sacral and SI joint pain, low back pain. SI joint

pain is left greater than right.



FINDINGS: The osseous structures demonstrate normal signal

intensity. No fractures appreciated. The sacroiliac joints are

bilaterally symmetric in appearance. No abnormal signal intensity

appreciated.



There is T2 hyperintensity overlying both greater trochanters,

left greater than right, likely due to bursitis. A tear of the

adjacent tendons is difficult to exclude on the large

field-of-view. The hamstrings tendon origins are bilaterally

intact.



The visualized intrapelvic structures are unremarkable.



IMPRESSION:

1. Likely bilateral greater trochanteric bursitis, left worse

than right. Adjacent tendinous tears not excluded on this

examination.



Dictated by: 



  Dictated on workstation # [id number]

## 2021-05-21 ENCOUNTER — HOSPITAL ENCOUNTER (EMERGENCY)
Dept: HOSPITAL 75 - ER FS | Age: 81
Discharge: HOME | End: 2021-05-21
Payer: MEDICARE

## 2021-05-21 VITALS — HEIGHT: 62.99 IN | WEIGHT: 129.85 LBS | BODY MASS INDEX: 23.01 KG/M2

## 2021-05-21 VITALS — SYSTOLIC BLOOD PRESSURE: 140 MMHG | DIASTOLIC BLOOD PRESSURE: 54 MMHG

## 2021-05-21 DIAGNOSIS — Z87.19: ICD-10-CM

## 2021-05-21 DIAGNOSIS — R11.0: ICD-10-CM

## 2021-05-21 DIAGNOSIS — I10: ICD-10-CM

## 2021-05-21 DIAGNOSIS — K59.00: Primary | ICD-10-CM

## 2021-05-21 DIAGNOSIS — M81.0: ICD-10-CM

## 2021-05-21 DIAGNOSIS — Z79.899: ICD-10-CM

## 2021-05-21 LAB
ALBUMIN SERPL-MCNC: 4.2 GM/DL (ref 3.2–4.5)
ALP SERPL-CCNC: 58 U/L (ref 40–136)
ALT SERPL-CCNC: 11 U/L (ref 0–55)
BASOPHILS # BLD AUTO: 0 10^3/UL (ref 0–0.1)
BASOPHILS NFR BLD AUTO: 1 % (ref 0–10)
BILIRUB SERPL-MCNC: 0.2 MG/DL (ref 0.1–1)
BUN/CREAT SERPL: 13
CALCIUM SERPL-MCNC: 9.6 MG/DL (ref 8.5–10.1)
CHLORIDE SERPL-SCNC: 91 MMOL/L (ref 98–107)
CO2 SERPL-SCNC: 22 MMOL/L (ref 21–32)
CREAT SERPL-MCNC: 1.04 MG/DL (ref 0.6–1.3)
EOSINOPHIL # BLD AUTO: 0.1 10^3/UL (ref 0–0.3)
EOSINOPHIL NFR BLD AUTO: 2 % (ref 0–10)
GFR SERPLBLD BASED ON 1.73 SQ M-ARVRAT: 51 ML/MIN
GLUCOSE SERPL-MCNC: 108 MG/DL (ref 70–105)
HCT VFR BLD CALC: 33 % (ref 35–52)
HGB BLD-MCNC: 11.2 G/DL (ref 11.5–16)
LYMPHOCYTES # BLD AUTO: 1.7 X 10^3 (ref 1–4)
LYMPHOCYTES NFR BLD AUTO: 26 % (ref 12–44)
MANUAL DIFFERENTIAL PERFORMED BLD QL: NO
MCH RBC QN AUTO: 30 PG (ref 25–34)
MCHC RBC AUTO-ENTMCNC: 34 G/DL (ref 32–36)
MCV RBC AUTO: 89 FL (ref 80–99)
MONOCYTES # BLD AUTO: 0.5 X 10^3 (ref 0–1)
MONOCYTES NFR BLD AUTO: 8 % (ref 0–12)
NEUTROPHILS # BLD AUTO: 4.1 X 10^3 (ref 1.8–7.8)
NEUTROPHILS NFR BLD AUTO: 64 % (ref 42–75)
PLATELET # BLD: 288 10^3/UL (ref 130–400)
PMV BLD AUTO: 9.7 FL (ref 7.4–10.4)
POTASSIUM SERPL-SCNC: 4.7 MMOL/L (ref 3.6–5)
PROT SERPL-MCNC: 7 GM/DL (ref 6.4–8.2)
SODIUM SERPL-SCNC: 126 MMOL/L (ref 135–145)
WBC # BLD AUTO: 6.5 10^3/UL (ref 4.3–11)

## 2021-05-21 PROCEDURE — 80053 COMPREHEN METABOLIC PANEL: CPT

## 2021-05-21 PROCEDURE — 36415 COLL VENOUS BLD VENIPUNCTURE: CPT

## 2021-05-21 PROCEDURE — 85025 COMPLETE CBC W/AUTO DIFF WBC: CPT

## 2021-05-21 PROCEDURE — 74019 RADEX ABDOMEN 2 VIEWS: CPT

## 2021-05-21 NOTE — DIAGNOSTIC IMAGING REPORT
INDICATION: Abdominal pain and vomiting.



TIME OF EXAM: 3:01 PM



FINDINGS: No free air is identified. There is a large amount of

stool throughout the colon consistent with constipation. Bowel

gas pattern appears nonobstructed. No pathologic calcifications

are seen.



IMPRESSION: Findings consistent with constipation.



Dictated by: 



  Dictated on workstation # [id number]

## 2021-05-21 NOTE — ED ABDOMINAL PAIN
General


Stated Complaint:  N/V





History of Present Illness


Date Seen by Provider:  May 21, 2021


Time Seen by Provider:  14:42


Initial Comments


80-year-old female presents with nausea and constipation.  Patient reports that 

she has been constipated for about 4 days.  She has had a hard small stool 4 

days ago.  She is got some mild nausea, some mild left lower quadrant pain.  No 

vomiting.  Maybe a little chilled this morning.  She has tried suppositories and

laxatives with no relief.  She denies any urinary symptoms.





Allergies and Home Medications


Allergies


Coded Allergies:  


     hydrocodone (Verified  Adverse Reaction, Unknown, Nausea, 12/30/20)


     oxycodone (Verified  Adverse Reaction, Unknown, Nausea, 12/30/20)





Home Medications


Acetaminophen 325 Mg Tablet, 325-650 MG PO Q8H PRN for PAIN-MILD (1-4), 

(Reported)


Amlodipine Besylate 5 Mg Tablet, 5 MG PO HS, (Reported)


Calcium Carbonate/Vitamin D3 1 Each Tablet, 1 EACH PO DAILY, (Reported)


Clonidine HCl 0.1 Mg Tablet, 0.05 MG PO BID PRN for BP OVER 160, (Reported)


   TAKES  OF A 0.1MG TAB 


Denosumab 60 Mg/1 Ml Disp.syrin, 60 MG SQ EVERY 6 MONTHS, (Reported)


Diclofenac Sodium 100 Gm Gel..gram., 4 GM TP BID PRN for PAIN-BREAKTHROUGH, 

(Reported)


Enalapril Maleate 20 Mg Tablet, 20 MG PO 1200, (Reported)


Hydrochlorothiazide 12.5 Mg Tablet, 12.5 MG PO Q48H, (Reported)


Latanoprost 2.5 Ml Drops, 1 DROPS OS HS, (Reported)


Metoprolol Succinate 25 Mg Tab.er.24h, 25 MG PO DAILY, (Reported)


Multivitamin 1 Each Tablet, 1 EACH PO DAILY, (Reported)


Niacinamide 500 Mg Tablet, 500 MG PO DAILY, (Reported)


Povidone 15 Ml Drops, 1-2 DROPS OT PRN PRN for DRY EYES, (Reported)


Zolpidem Tartrate 5 Mg Tablet, 2.5 MG PO HS, (Reported)


   TAKES  OF A 5MG TAB 





Patient Home Medication List


Home Medication List Reviewed:  Yes





Review of Systems


Review of Systems


Constitutional:  chills; No fever


Respiratory:  Denies Cough, Denies Shortness of Air


Cardiovascular:  Denies Chest Pain, Denies Palpitations


Gastrointestinal:  See HPI, Abdominal Pain, Constipated, Nausea; Denies Vomiting


Genitourinary:  No Symptoms Reported


Musculoskeletal:  no symptoms reported


Skin:  no symptoms reported


Psychiatric/Neurological:  No Symptoms Reported


Endocrine:  No Symptoms Reported


Hematologic/Lymphatic:  No Symptoms Reported





Past Medical-Social-Family Hx


Patient Social History


Recent Hopitalizations:  No





Past Medical History


Surgeries:  Yes (maria ines mastectomy)


Eye Surgery, Gallbladder, Hysterectomy


Respiratory:  No


Cardiac:  Yes (tachycardia)


Hypertension


Genitourinary:  Yes (kideny aretery stenosis)


Colitis, Diverticulosis, Pancreatitis, Ulcer


Musculoskeletal:  Yes


Osteoporosis


Endocrine:  No


HEENT:  Yes


Cataract


Cancer:  Yes


Breast


Psychosocial:  No


Integumentary:  No


Blood Disorders:  No





Physical Exam


Vital Signs





Vital Signs - First Documented








 5/21/21





 14:43


 


Temp 37.0


 


Pulse 68


 


Resp 16


 


B/P (MAP) 140/59 (86)


 


Pulse Ox 100


 


O2 Delivery Room Air





Capillary Refill :


Height/Weight/BMI


Height: '"


Weight: lbs. oz. kg; 23.24 BMI


Method:


General Appearance:  WD/WN


HEENT:  PERRL/EOMI


Neck:  full range of motion, supple


Respiratory:  lungs clear, normal breath sounds


Cardiovascular:  normal peripheral pulses, regular rate, rhythm


Gastrointestinal:  soft, tenderness (Minimal tenderness left lower quadrant)


Extremities:  non-tender, normal inspection


Neurologic/Psychiatric:  alert, normal mood/affect, oriented x 3


Skin:  normal color, warm/dry





Progress/Results/Core Measures


Results/Orders


Lab Results





Laboratory Tests








Test


 5/21/21


14:48 Range/Units


 


 


White Blood Count


 6.5 


 4.3-11.0


10^3/uL


 


Red Blood Count


 3.68 L


 4.35-5.85


10^6/uL


 


Hemoglobin 11.2 L 11.5-16.0  G/DL


 


Hematocrit 33 L 35-52  %


 


Mean Corpuscular Volume 89  80-99  FL


 


Mean Corpuscular Hemoglobin 30  25-34  PG


 


Mean Corpuscular Hemoglobin


Concent 34 


 32-36  G/DL





 


Red Cell Distribution Width 12.4  10.0-14.5  %


 


Platelet Count


 288 


 130-400


10^3/uL


 


Mean Platelet Volume 9.7  7.4-10.4  FL


 


Immature Granulocyte % (Auto) 0   %


 


Neutrophils (%) (Auto) 64  42-75  %


 


Lymphocytes (%) (Auto) 26  12-44  %


 


Monocytes (%) (Auto) 8  0-12  %


 


Eosinophils (%) (Auto) 2  0-10  %


 


Basophils (%) (Auto) 1  0-10  %


 


Neutrophils # (Auto) 4.1  1.8-7.8  X 10^3


 


Lymphocytes # (Auto) 1.7  1.0-4.0  X 10^3


 


Monocytes # (Auto) 0.5  0.0-1.0  X 10^3


 


Eosinophils # (Auto)


 0.1 


 0.0-0.3


10^3/uL


 


Basophils # (Auto)


 0.0 


 0.0-0.1


10^3/uL


 


Immature Granulocyte # (Auto)


 0.0 


 0.0-0.1


10^3/uL


 


Percent Immature Platelet


Fraction 1.8 


 0.0-7.6  %





 


Sodium Level 126 L 135-145  MMOL/L


 


Potassium Level 4.7  3.6-5.0  MMOL/L


 


Chloride Level 91 L   MMOL/L


 


Carbon Dioxide Level 22  21-32  MMOL/L


 


Anion Gap 13  5-14  MMOL/L


 


Blood Urea Nitrogen 13  7-18  MG/DL


 


Creatinine


 1.04 


 0.60-1.30


MG/DL


 


Estimat Glomerular Filtration


Rate 51 


  





 


BUN/Creatinine Ratio 13   


 


Glucose Level 108 H   MG/DL


 


Calcium Level 9.6  8.5-10.1  MG/DL


 


Corrected Calcium 9.4  8.5-10.1  MG/DL


 


Total Bilirubin 0.2  0.1-1.0  MG/DL


 


Aspartate Amino Transf


(AST/SGOT) 28 


 5-34  U/L





 


Alanine Aminotransferase


(ALT/SGPT) 11 


 0-55  U/L





 


Alkaline Phosphatase 58    U/L


 


Total Protein 7.0  6.4-8.2  GM/DL


 


Albumin 4.2  3.2-4.5  GM/DL








My Orders





Orders - BOWLING,HORACIO L DO


Cbc With Automated Diff (5/21/21 14:45)


Comprehensive Metabolic Panel (5/21/21 14:45)


Ua Culture If Indicated (5/21/21 14:45)


Abdomen Flat & Upright/Decub (5/21/21 14:45)


Lactated Ringers (Lr 1000 Ml Iv Solution (5/21/21 14:45)


Metoclopramide Injection (Reglan Injecti (5/21/21 15:10)


Magnesium Citrate Oral Soln (Citrate Of (5/21/21 15:30)


Bisacodyl Suppository (Dulcolax Supposit (5/21/21 15:30)


Promethazine Injection (Phenergan Injec (5/21/21 17:18)


Ed Iv/Invasive Line Start (5/21/21 18:33)


Ns Iv 500 Ml (Sodium Chloride 0.9%) (5/21/21 18:45)


Na Phos/Na Biphos Enema (Fleet Enema [place] (5/21/21 19:15)





Medications Given in ED





Current Medications








 Medications  Dose


 Ordered  Sig/Manpreet


 Route  Start Time


 Stop Time Status Last Admin


Dose Admin


 


 Bisacodyl  10 mg  ONCE  ONCE


 W[place]  5/21/21 15:30


 5/21/21 15:31 DC 5/21/21 15:40


10 MG


 


 Magnesium Citrate  300 ml  ONCE  ONCE


 PO  5/21/21 15:30


 5/21/21 15:31 DC 5/21/21 16:25


300 ML


 


 Sodium


 Biphosphate/


 Sodium Phosphate  1 ea  ONCE  ONCE


 W[place]  5/21/21 19:15


 5/21/21 19:16 DC 5/21/21 19:12


1 EA


 


 Sodium Chloride  500 ml @ 0


 mls/hr  Q0M ONCE


 IV  5/21/21 18:45


 5/21/21 18:46 DC 5/21/21 19:00


1,000 MLS/HR








Vital Signs/I&O











 5/21/21





 14:43


 


Temp 37.0


 


Pulse 68


 


Resp 16


 


B/P (MAP) 140/59 (86)


 


Pulse Ox 100


 


O2 Delivery Room Air











Progress


Progress Note :  


Progress Note


Patient wanted to stay and attempt to have a bowel movement. Patient was given a

bottle of mag citrate. This upset her stomach and she had a episode of vomiting 

and some dry heaves following the mag citrate. Patient received about a liter 

and a half of fluids. The nausea subsided with some medication. Patient 

initially wanted to try an enema. I did discuss with her that the stool load 

seem to be high up in her abdomen and with unlikely work but we did attempt it 

with no relief. Patient was offered admission with nasal NG tube and and further

treatment but she declined. I discussed with patient the need to use MiraLAX. To

use 4-5 times a day. That she should put a capful in beverage of her choice and 

sip on it slowly. She should drink plenty of fluids. She should also increase 

the fiber in her diet.. Patient stable and discharged home





Diagnostic Imaging





   Diagonstic Imaging:  Xray


   Plain Films/CT/US/NM/MRI:  abdomen


Comments


Date of Exam:05/21/21





ABDOMEN FLAT & UPRIGHT/DECUB








INDICATION: Abdominal pain and vomiting.





TIME OF EXAM: 3:01 PM





FINDINGS: No free air is identified. There is a large amount of


stool throughout the colon consistent with constipation. Bowel


gas pattern appears nonobstructed. No pathologic calcifications


are seen.





IMPRESSION: Findings consistent with constipation





Departure


Impression





   Primary Impression:  


   Constipation


   Qualified Codes:  K59.00 - Constipation, unspecified


Disposition:  01 HOME, SELF-CARE


Condition:  Stable





Departure-Patient Inst.


Referrals:  


NO,LOCAL PHYSICIAN (PCP/Family)


Primary Care Physician


Patient Instructions:  Constipation, Adult (DC), Dealing with Constipation from 

the Drugs You Take





Add. Discharge Instructions:  


MiraLAX, 1 capful 3-4 times daily until soft daily stool then once daily as 

needed for soft daily stool


Increase fiber in your diet, you may use Metamucil


Drink plenty of fluids


Frequent ambulation











HORACIO BOWLING DO               May 21, 2021 14:45

## 2021-08-16 ENCOUNTER — HOSPITAL ENCOUNTER (OUTPATIENT)
Dept: HOSPITAL 75 - ONC | Age: 81
LOS: 90 days | Discharge: HOME | End: 2021-11-14
Attending: INTERNAL MEDICINE
Payer: MEDICARE

## 2021-08-16 DIAGNOSIS — C50.111: Primary | ICD-10-CM

## 2021-08-16 DIAGNOSIS — M81.0: ICD-10-CM

## 2021-08-16 DIAGNOSIS — D50.9: ICD-10-CM

## 2021-08-16 DIAGNOSIS — Z90.13: ICD-10-CM

## 2021-08-16 LAB
ALBUMIN SERPL-MCNC: 4.2 GM/DL (ref 3.2–4.5)
ALP SERPL-CCNC: 79 U/L (ref 40–136)
ALT SERPL-CCNC: 12 U/L (ref 0–55)
BASOPHILS # BLD AUTO: 0.1 10^3/UL (ref 0–0.1)
BASOPHILS NFR BLD AUTO: 1 % (ref 0–10)
BILIRUB SERPL-MCNC: 0.2 MG/DL (ref 0.1–1)
BUN/CREAT SERPL: 18
CALCIUM SERPL-MCNC: 9.7 MG/DL (ref 8.5–10.1)
CHLORIDE SERPL-SCNC: 105 MMOL/L (ref 98–107)
CO2 SERPL-SCNC: 20 MMOL/L (ref 21–32)
CREAT SERPL-MCNC: 1.13 MG/DL (ref 0.6–1.3)
EOSINOPHIL # BLD AUTO: 0.2 10^3/UL (ref 0–0.3)
EOSINOPHIL NFR BLD AUTO: 2 % (ref 0–10)
GFR SERPLBLD BASED ON 1.73 SQ M-ARVRAT: 47 ML/MIN
GLUCOSE SERPL-MCNC: 117 MG/DL (ref 70–105)
HCT VFR BLD CALC: 34 % (ref 35–52)
HGB BLD-MCNC: 10.9 G/DL (ref 11.5–16)
LYMPHOCYTES # BLD AUTO: 1.6 10^3/UL (ref 1–4)
LYMPHOCYTES NFR BLD AUTO: 23 % (ref 12–44)
MANUAL DIFFERENTIAL PERFORMED BLD QL: NO
MCH RBC QN AUTO: 30 PG (ref 25–34)
MCHC RBC AUTO-ENTMCNC: 32 G/DL (ref 32–36)
MCV RBC AUTO: 93 FL (ref 80–99)
MONOCYTES # BLD AUTO: 0.3 10^3/UL (ref 0–1)
MONOCYTES NFR BLD AUTO: 5 % (ref 0–12)
NEUTROPHILS # BLD AUTO: 4.7 10^3/UL (ref 1.8–7.8)
NEUTROPHILS NFR BLD AUTO: 69 % (ref 42–75)
PLATELET # BLD: 292 10^3/UL (ref 130–400)
PMV BLD AUTO: 9 FL (ref 9–12.2)
POTASSIUM SERPL-SCNC: 3.9 MMOL/L (ref 3.6–5)
PROT SERPL-MCNC: 7.2 GM/DL (ref 6.4–8.2)
SODIUM SERPL-SCNC: 135 MMOL/L (ref 135–145)
WBC # BLD AUTO: 6.8 10^3/UL (ref 4.3–11)

## 2021-08-16 PROCEDURE — 85025 COMPLETE CBC W/AUTO DIFF WBC: CPT

## 2021-08-16 PROCEDURE — 99214 OFFICE O/P EST MOD 30 MIN: CPT

## 2021-08-16 PROCEDURE — 83550 IRON BINDING TEST: CPT

## 2021-08-16 PROCEDURE — 82728 ASSAY OF FERRITIN: CPT

## 2021-08-16 PROCEDURE — 80053 COMPREHEN METABOLIC PANEL: CPT

## 2021-08-16 PROCEDURE — 83540 ASSAY OF IRON: CPT

## 2021-09-17 ENCOUNTER — HOSPITAL ENCOUNTER (OUTPATIENT)
Dept: HOSPITAL 75 - LAB FS | Age: 81
End: 2021-09-17
Payer: MEDICARE

## 2021-09-17 DIAGNOSIS — Z01.812: Primary | ICD-10-CM

## 2021-09-17 LAB
APTT PPP: YELLOW S
BACTERIA #/AREA URNS HPF: (no result) /HPF
BASOPHILS # BLD AUTO: 0 10^3/UL (ref 0–0.1)
BASOPHILS NFR BLD AUTO: 1 % (ref 0–10)
BILIRUB UR QL STRIP: NEGATIVE
BUN/CREAT SERPL: 18
CALCIUM SERPL-MCNC: 9.6 MG/DL (ref 8.5–10.1)
CHLORIDE SERPL-SCNC: 98 MMOL/L (ref 98–107)
CO2 SERPL-SCNC: 23 MMOL/L (ref 21–32)
CREAT SERPL-MCNC: 1.26 MG/DL (ref 0.6–1.3)
EOSINOPHIL # BLD AUTO: 0.2 10^3/UL (ref 0–0.3)
EOSINOPHIL NFR BLD AUTO: 3 % (ref 0–10)
FIBRINOGEN PPP-MCNC: CLEAR MG/DL
GFR SERPLBLD BASED ON 1.73 SQ M-ARVRAT: 41 ML/MIN
GLUCOSE SERPL-MCNC: 107 MG/DL (ref 70–105)
GLUCOSE UR STRIP-MCNC: NEGATIVE MG/DL
HCT VFR BLD CALC: 35 % (ref 35–52)
HGB BLD-MCNC: 11.2 G/DL (ref 11.5–16)
KETONES UR QL STRIP: (no result)
LEUKOCYTE ESTERASE UR QL STRIP: NEGATIVE
LYMPHOCYTES # BLD AUTO: 1.6 X 10^3 (ref 1–4)
LYMPHOCYTES NFR BLD AUTO: 27 % (ref 12–44)
MANUAL DIFFERENTIAL PERFORMED BLD QL: NO
MCH RBC QN AUTO: 30 PG (ref 25–34)
MCHC RBC AUTO-ENTMCNC: 32 G/DL (ref 32–36)
MCV RBC AUTO: 93 FL (ref 80–99)
MONOCYTES # BLD AUTO: 0.4 X 10^3 (ref 0–1)
MONOCYTES NFR BLD AUTO: 7 % (ref 0–12)
NEUTROPHILS # BLD AUTO: 3.7 X 10^3 (ref 1.8–7.8)
NEUTROPHILS NFR BLD AUTO: 62 % (ref 42–75)
NITRITE UR QL STRIP: NEGATIVE
PH UR STRIP: 5.5 [PH] (ref 5–9)
PLATELET # BLD: 330 10^3/UL (ref 130–400)
PMV BLD AUTO: 9.4 FL (ref 9–12.2)
POTASSIUM SERPL-SCNC: 4.3 MMOL/L (ref 3.6–5)
PROT UR QL STRIP: NEGATIVE
RBC #/AREA URNS HPF: (no result) /HPF
SODIUM SERPL-SCNC: 134 MMOL/L (ref 135–145)
SP GR UR STRIP: 1.01 (ref 1.02–1.02)
SQUAMOUS #/AREA URNS HPF: (no result) /HPF
WBC # BLD AUTO: 5.9 10^3/UL (ref 4.3–11)
WBC #/AREA URNS HPF: (no result) /HPF

## 2021-09-17 PROCEDURE — 80048 BASIC METABOLIC PNL TOTAL CA: CPT

## 2021-09-17 PROCEDURE — 36415 COLL VENOUS BLD VENIPUNCTURE: CPT

## 2021-09-17 PROCEDURE — 87081 CULTURE SCREEN ONLY: CPT

## 2021-09-17 PROCEDURE — 81000 URINALYSIS NONAUTO W/SCOPE: CPT

## 2021-09-17 PROCEDURE — 85025 COMPLETE CBC W/AUTO DIFF WBC: CPT

## 2022-02-07 LAB
ALBUMIN SERPL-MCNC: 4.1 GM/DL (ref 3.2–4.5)
ALP SERPL-CCNC: 68 U/L (ref 40–136)
ALT SERPL-CCNC: 13 U/L (ref 0–55)
BASOPHILS # BLD AUTO: 0 10^3/UL (ref 0–0.1)
BASOPHILS NFR BLD AUTO: 0 % (ref 0–10)
BILIRUB SERPL-MCNC: 0.3 MG/DL (ref 0.1–1)
BUN/CREAT SERPL: 19
CALCIUM SERPL-MCNC: 9.6 MG/DL (ref 8.5–10.1)
CHLORIDE SERPL-SCNC: 101 MMOL/L (ref 98–107)
CO2 SERPL-SCNC: 24 MMOL/L (ref 21–32)
CREAT SERPL-MCNC: 1.08 MG/DL (ref 0.6–1.3)
EOSINOPHIL # BLD AUTO: 0.2 10^3/UL (ref 0–0.3)
EOSINOPHIL NFR BLD AUTO: 3 % (ref 0–10)
GFR SERPLBLD BASED ON 1.73 SQ M-ARVRAT: 52 ML/MIN
GLUCOSE SERPL-MCNC: 114 MG/DL (ref 70–105)
HCT VFR BLD CALC: 32 % (ref 35–52)
HGB BLD-MCNC: 10.2 G/DL (ref 11.5–16)
LYMPHOCYTES # BLD AUTO: 1.5 10^3/UL (ref 1–4)
LYMPHOCYTES NFR BLD AUTO: 23 % (ref 12–44)
MANUAL DIFFERENTIAL PERFORMED BLD QL: NO
MCH RBC QN AUTO: 31 PG (ref 25–34)
MCHC RBC AUTO-ENTMCNC: 32 G/DL (ref 32–36)
MCV RBC AUTO: 95 FL (ref 80–99)
MONOCYTES # BLD AUTO: 0.4 10^3/UL (ref 0–1)
MONOCYTES NFR BLD AUTO: 6 % (ref 0–12)
NEUTROPHILS # BLD AUTO: 4.2 10^3/UL (ref 1.8–7.8)
NEUTROPHILS NFR BLD AUTO: 67 % (ref 42–75)
PLATELET # BLD: 292 10^3/UL (ref 130–400)
PMV BLD AUTO: 9 FL (ref 9–12.2)
POTASSIUM SERPL-SCNC: 3.9 MMOL/L (ref 3.6–5)
PROT SERPL-MCNC: 7 GM/DL (ref 6.4–8.2)
SODIUM SERPL-SCNC: 137 MMOL/L (ref 135–145)
WBC # BLD AUTO: 6.2 10^3/UL (ref 4.3–11)

## 2022-02-11 ENCOUNTER — HOSPITAL ENCOUNTER (OUTPATIENT)
Dept: HOSPITAL 75 - ONC | Age: 82
LOS: 17 days | Discharge: HOME | End: 2022-02-28
Attending: INTERNAL MEDICINE
Payer: MEDICARE

## 2022-02-11 DIAGNOSIS — Z90.13: ICD-10-CM

## 2022-02-11 DIAGNOSIS — M54.50: ICD-10-CM

## 2022-02-11 DIAGNOSIS — D50.9: ICD-10-CM

## 2022-02-11 DIAGNOSIS — Z45.2: Primary | ICD-10-CM

## 2022-02-11 DIAGNOSIS — C50.111: ICD-10-CM

## 2022-02-11 PROCEDURE — 99213 OFFICE O/P EST LOW 20 MIN: CPT

## 2022-02-11 PROCEDURE — 80053 COMPREHEN METABOLIC PANEL: CPT

## 2022-02-11 PROCEDURE — 82274 ASSAY TEST FOR BLOOD FECAL: CPT

## 2022-02-11 PROCEDURE — 85025 COMPLETE CBC W/AUTO DIFF WBC: CPT

## 2022-02-11 PROCEDURE — 36415 COLL VENOUS BLD VENIPUNCTURE: CPT

## 2022-02-23 ENCOUNTER — HOSPITAL ENCOUNTER (OUTPATIENT)
Dept: HOSPITAL 75 - PREOP | Age: 82
Discharge: HOME | End: 2022-02-23
Attending: SURGERY
Payer: MEDICARE

## 2022-02-23 DIAGNOSIS — Z01.818: Primary | ICD-10-CM

## 2022-05-12 ENCOUNTER — HOSPITAL ENCOUNTER (OUTPATIENT)
Dept: HOSPITAL 75 - ONC | Age: 82
LOS: 19 days | Discharge: HOME | End: 2022-05-31
Attending: INTERNAL MEDICINE
Payer: MEDICARE

## 2022-05-12 DIAGNOSIS — M54.50: ICD-10-CM

## 2022-05-12 DIAGNOSIS — Z90.13: ICD-10-CM

## 2022-05-12 DIAGNOSIS — D50.9: ICD-10-CM

## 2022-05-12 DIAGNOSIS — C50.111: Primary | ICD-10-CM

## 2022-05-12 LAB
ALBUMIN SERPL-MCNC: 4.5 GM/DL (ref 3.2–4.5)
ALP SERPL-CCNC: 78 U/L (ref 40–136)
ALT SERPL-CCNC: 14 U/L (ref 0–55)
BASOPHILS # BLD AUTO: 0 10^3/UL (ref 0–0.1)
BASOPHILS NFR BLD AUTO: 1 % (ref 0–10)
BILIRUB SERPL-MCNC: 0.4 MG/DL (ref 0.1–1)
BUN/CREAT SERPL: 19
CALCIUM SERPL-MCNC: 10.6 MG/DL (ref 8.5–10.1)
CHLORIDE SERPL-SCNC: 97 MMOL/L (ref 98–107)
CO2 SERPL-SCNC: 21 MMOL/L (ref 21–32)
CREAT SERPL-MCNC: 1.05 MG/DL (ref 0.6–1.3)
EOSINOPHIL # BLD AUTO: 0.2 10^3/UL (ref 0–0.3)
EOSINOPHIL NFR BLD AUTO: 3 % (ref 0–10)
GFR SERPLBLD BASED ON 1.73 SQ M-ARVRAT: 54 ML/MIN
GLUCOSE SERPL-MCNC: 92 MG/DL (ref 70–105)
HCT VFR BLD CALC: 31 % (ref 35–52)
HGB BLD-MCNC: 10.3 G/DL (ref 11.5–16)
LYMPHOCYTES # BLD AUTO: 1.4 10^3/UL (ref 1–4)
LYMPHOCYTES NFR BLD AUTO: 26 % (ref 12–44)
MANUAL DIFFERENTIAL PERFORMED BLD QL: NO
MCH RBC QN AUTO: 30 PG (ref 25–34)
MCHC RBC AUTO-ENTMCNC: 33 G/DL (ref 32–36)
MCV RBC AUTO: 92 FL (ref 80–99)
MONOCYTES # BLD AUTO: 0.5 10^3/UL (ref 0–1)
MONOCYTES NFR BLD AUTO: 9 % (ref 0–12)
NEUTROPHILS # BLD AUTO: 3.4 10^3/UL (ref 1.8–7.8)
NEUTROPHILS NFR BLD AUTO: 61 % (ref 42–75)
PLATELET # BLD: 357 10^3/UL (ref 130–400)
PMV BLD AUTO: 8.7 FL (ref 9–12.2)
POTASSIUM SERPL-SCNC: 4.2 MMOL/L (ref 3.6–5)
PROT SERPL-MCNC: 7.2 GM/DL (ref 6.4–8.2)
SODIUM SERPL-SCNC: 134 MMOL/L (ref 135–145)
WBC # BLD AUTO: 5.5 10^3/UL (ref 4.3–11)

## 2022-05-12 PROCEDURE — 82728 ASSAY OF FERRITIN: CPT

## 2022-05-12 PROCEDURE — 36415 COLL VENOUS BLD VENIPUNCTURE: CPT

## 2022-05-12 PROCEDURE — 80053 COMPREHEN METABOLIC PANEL: CPT

## 2022-05-12 PROCEDURE — 83550 IRON BINDING TEST: CPT

## 2022-05-12 PROCEDURE — 85025 COMPLETE CBC W/AUTO DIFF WBC: CPT

## 2022-05-12 PROCEDURE — 83540 ASSAY OF IRON: CPT

## 2022-12-28 ENCOUNTER — HOSPITAL ENCOUNTER (OUTPATIENT)
Dept: HOSPITAL 75 - RAD | Age: 82
End: 2022-12-28
Attending: FAMILY MEDICINE
Payer: MEDICARE

## 2022-12-28 DIAGNOSIS — M43.16: ICD-10-CM

## 2022-12-28 DIAGNOSIS — M48.061: ICD-10-CM

## 2022-12-28 DIAGNOSIS — M48.07: ICD-10-CM

## 2022-12-28 DIAGNOSIS — M51.36: ICD-10-CM

## 2022-12-28 DIAGNOSIS — M47.816: Primary | ICD-10-CM

## 2022-12-28 DIAGNOSIS — M71.352: ICD-10-CM

## 2022-12-28 DIAGNOSIS — M51.26: ICD-10-CM

## 2022-12-28 DIAGNOSIS — M47.817: ICD-10-CM

## 2022-12-28 DIAGNOSIS — M51.27: ICD-10-CM

## 2022-12-28 PROCEDURE — 72148 MRI LUMBAR SPINE W/O DYE: CPT

## 2022-12-28 NOTE — DIAGNOSTIC IMAGING REPORT
PROCEDURE: MRI lumbar spine.



TECHNIQUE: Multiplanar, multisequence MRI of the lumbar spine was

performed without contrast.



INDICATION:  Low back pain. History of lumbar spine surgery.

Breast cancer. 



COMPARISON: MRI lumbar spine 05/11/2021.



FINDINGS: There are 5 lumbar-type vertebral bodies for the

purposes of this report. Grade 1 anterolisthesis of L4 and L5.

Right apex lower lumbar curvature measures 18 degrees.



Vertebral body heights are preserved. Modic type I degenerative

endplate changes at L1-L2 and L4-L5.



L4 laminectomy. No fluid collections in the postoperative soft

tissues.



No abnormal signal in the conus which terminates at L1. Normal

morphology of the cauda equina.



The visualized pelvis and paravertebral soft tissues demonstrate

no acute findings.



L1-L2: Disc osteophyte complex and facet arthropathy result in

mild spinal canal and bilateral lateral recess narrowing. Severe

right and moderate left neural foraminal narrowing.



L2-L3: Annular disc bulging and facet arthropathy result in

severe left and moderate right lateral recess narrowing. Moderate

to severe spinal canal stenosis. Moderate to severe bilateral

neural foraminal narrowing.



L3-L4: Annular disc bulging, ligamentous hypertrophy and facet

arthropathy result in moderate spinal canal stenosis. Severe left

lateral recess narrowing is exacerbated by a left synovial facet

cyst which measures up to 0.7 cm. This is new compared to the

prior. Severe bilateral neural foraminal narrowing.



L4-L5: Spinal canal is decompressed well. Anterolisthesis,

annular disc bulging and facet arthropathy result in severe

bilateral lateral recess narrowing. Severe bilateral neural

foraminal narrowing.



L5-S1: Small central disc protrusion. Advanced facet arthropathy.

Mild spinal canal and bilateral lateral recess narrowing.

Moderate bilateral neural foraminal narrowing.



IMPRESSION: 

1. Interval L4 laminectomy.

2. There has been progression of the spondylotic changes which

result in moderate to severe spinal canal stenosis at L2-L3,

moderate at L3-L4.

3. Multilevel high-grade lateral recess and neural foraminal

narrowing detailed above.

4. No acute osseous findings. Modic type I degenerative endplate

changes at L1-L2 and L4-L5.



Dictated by: 



  Dictated on workstation # [id number]

## 2023-01-25 ENCOUNTER — HOSPITAL ENCOUNTER (OUTPATIENT)
Dept: HOSPITAL 75 - ONC | Age: 83
LOS: 6 days | Discharge: HOME | End: 2023-01-31
Attending: INTERNAL MEDICINE
Payer: MEDICARE

## 2023-01-25 DIAGNOSIS — Z96.651: ICD-10-CM

## 2023-01-25 DIAGNOSIS — Z90.13: ICD-10-CM

## 2023-01-25 DIAGNOSIS — M54.50: ICD-10-CM

## 2023-01-25 DIAGNOSIS — C50.111: Primary | ICD-10-CM

## 2023-01-25 DIAGNOSIS — D50.9: ICD-10-CM

## 2023-01-25 LAB
ALBUMIN SERPL-MCNC: 4.4 GM/DL (ref 3.2–4.5)
ALP SERPL-CCNC: 78 U/L (ref 40–136)
ALT SERPL-CCNC: 12 U/L (ref 0–55)
BASOPHILS # BLD AUTO: 0.1 10^3/UL (ref 0–0.1)
BASOPHILS NFR BLD AUTO: 1 % (ref 0–10)
BILIRUB SERPL-MCNC: 0.4 MG/DL (ref 0.1–1)
BUN/CREAT SERPL: 18
CALCIUM SERPL-MCNC: 10 MG/DL (ref 8.5–10.1)
CHLORIDE SERPL-SCNC: 100 MMOL/L (ref 98–107)
CO2 SERPL-SCNC: 24 MMOL/L (ref 21–32)
CREAT SERPL-MCNC: 0.96 MG/DL (ref 0.6–1.3)
EOSINOPHIL # BLD AUTO: 0.2 10^3/UL (ref 0–0.3)
EOSINOPHIL NFR BLD AUTO: 3 % (ref 0–10)
GFR SERPLBLD BASED ON 1.73 SQ M-ARVRAT: 60 ML/MIN
GLUCOSE SERPL-MCNC: 110 MG/DL (ref 70–105)
HCT VFR BLD CALC: 34 % (ref 35–52)
HGB BLD-MCNC: 10.9 G/DL (ref 11.5–16)
LYMPHOCYTES # BLD AUTO: 1.4 10^3/UL (ref 1–4)
LYMPHOCYTES NFR BLD AUTO: 25 % (ref 12–44)
MANUAL DIFFERENTIAL PERFORMED BLD QL: NO
MCH RBC QN AUTO: 30 PG (ref 25–34)
MCHC RBC AUTO-ENTMCNC: 32 G/DL (ref 32–36)
MCV RBC AUTO: 95 FL (ref 80–99)
MONOCYTES # BLD AUTO: 0.4 10^3/UL (ref 0–1)
MONOCYTES NFR BLD AUTO: 8 % (ref 0–12)
NEUTROPHILS # BLD AUTO: 3.5 10^3/UL (ref 1.8–7.8)
NEUTROPHILS NFR BLD AUTO: 63 % (ref 42–75)
PLATELET # BLD: 294 10^3/UL (ref 130–400)
PMV BLD AUTO: 9.1 FL (ref 9–12.2)
POTASSIUM SERPL-SCNC: 3.2 MMOL/L (ref 3.6–5)
PROT SERPL-MCNC: 7.3 GM/DL (ref 6.4–8.2)
SODIUM SERPL-SCNC: 136 MMOL/L (ref 135–145)
WBC # BLD AUTO: 5.5 10^3/UL (ref 4.3–11)

## 2023-01-25 PROCEDURE — 82728 ASSAY OF FERRITIN: CPT

## 2023-01-25 PROCEDURE — 83550 IRON BINDING TEST: CPT

## 2023-01-25 PROCEDURE — 99213 OFFICE O/P EST LOW 20 MIN: CPT

## 2023-01-25 PROCEDURE — 83540 ASSAY OF IRON: CPT

## 2023-01-25 PROCEDURE — 36415 COLL VENOUS BLD VENIPUNCTURE: CPT

## 2023-01-25 PROCEDURE — 85025 COMPLETE CBC W/AUTO DIFF WBC: CPT

## 2023-01-25 PROCEDURE — 80053 COMPREHEN METABOLIC PANEL: CPT
